# Patient Record
Sex: MALE | URBAN - METROPOLITAN AREA
[De-identification: names, ages, dates, MRNs, and addresses within clinical notes are randomized per-mention and may not be internally consistent; named-entity substitution may affect disease eponyms.]

---

## 2023-07-27 ENCOUNTER — OFFICE VISIT (OUTPATIENT)
Dept: FAMILY MEDICINE CLINIC | Age: 73
End: 2023-07-27
Payer: MEDICARE

## 2023-07-27 VITALS
SYSTOLIC BLOOD PRESSURE: 157 MMHG | BODY MASS INDEX: 26.07 KG/M2 | DIASTOLIC BLOOD PRESSURE: 78 MMHG | HEART RATE: 66 BPM | HEIGHT: 66 IN | TEMPERATURE: 97.8 F | WEIGHT: 162.2 LBS

## 2023-07-27 DIAGNOSIS — Z76.89 ESTABLISHING CARE WITH NEW DOCTOR, ENCOUNTER FOR: ICD-10-CM

## 2023-07-27 DIAGNOSIS — I10 PRIMARY HYPERTENSION: Primary | ICD-10-CM

## 2023-07-27 DIAGNOSIS — B35.1 ONYCHOMYCOSIS: ICD-10-CM

## 2023-07-27 DIAGNOSIS — L30.9 ECZEMA, UNSPECIFIED TYPE: ICD-10-CM

## 2023-07-27 PROCEDURE — 1036F TOBACCO NON-USER: CPT

## 2023-07-27 PROCEDURE — 3017F COLORECTAL CA SCREEN DOC REV: CPT

## 2023-07-27 PROCEDURE — 1123F ACP DISCUSS/DSCN MKR DOCD: CPT

## 2023-07-27 PROCEDURE — 99203 OFFICE O/P NEW LOW 30 MIN: CPT

## 2023-07-27 PROCEDURE — 3078F DIAST BP <80 MM HG: CPT

## 2023-07-27 PROCEDURE — 3077F SYST BP >= 140 MM HG: CPT

## 2023-07-27 PROCEDURE — G8419 CALC BMI OUT NRM PARAM NOF/U: HCPCS

## 2023-07-27 PROCEDURE — G8427 DOCREV CUR MEDS BY ELIG CLIN: HCPCS

## 2023-07-27 RX ORDER — AMLODIPINE BESYLATE 5 MG/1
5 TABLET ORAL DAILY
Qty: 30 TABLET | Refills: 2 | Status: SHIPPED | OUTPATIENT
Start: 2023-07-27

## 2023-07-27 RX ORDER — AMLODIPINE BESYLATE 5 MG/1
TABLET ORAL
COMMUNITY
Start: 2023-06-09 | End: 2023-07-27 | Stop reason: SDUPTHER

## 2023-07-27 SDOH — ECONOMIC STABILITY: FOOD INSECURITY: WITHIN THE PAST 12 MONTHS, YOU WORRIED THAT YOUR FOOD WOULD RUN OUT BEFORE YOU GOT MONEY TO BUY MORE.: NEVER TRUE

## 2023-07-27 SDOH — ECONOMIC STABILITY: FOOD INSECURITY: WITHIN THE PAST 12 MONTHS, THE FOOD YOU BOUGHT JUST DIDN'T LAST AND YOU DIDN'T HAVE MONEY TO GET MORE.: NEVER TRUE

## 2023-07-27 SDOH — ECONOMIC STABILITY: INCOME INSECURITY: HOW HARD IS IT FOR YOU TO PAY FOR THE VERY BASICS LIKE FOOD, HOUSING, MEDICAL CARE, AND HEATING?: NOT HARD AT ALL

## 2023-07-27 SDOH — ECONOMIC STABILITY: HOUSING INSECURITY
IN THE LAST 12 MONTHS, WAS THERE A TIME WHEN YOU DID NOT HAVE A STEADY PLACE TO SLEEP OR SLEPT IN A SHELTER (INCLUDING NOW)?: NO

## 2023-07-27 ASSESSMENT — ENCOUNTER SYMPTOMS
GASTROINTESTINAL NEGATIVE: 1
RESPIRATORY NEGATIVE: 1

## 2023-07-27 ASSESSMENT — PATIENT HEALTH QUESTIONNAIRE - PHQ9
SUM OF ALL RESPONSES TO PHQ QUESTIONS 1-9: 0
SUM OF ALL RESPONSES TO PHQ QUESTIONS 1-9: 0
1. LITTLE INTEREST OR PLEASURE IN DOING THINGS: 0
2. FEELING DOWN, DEPRESSED OR HOPELESS: 0
SUM OF ALL RESPONSES TO PHQ9 QUESTIONS 1 & 2: 0
SUM OF ALL RESPONSES TO PHQ QUESTIONS 1-9: 0
SUM OF ALL RESPONSES TO PHQ QUESTIONS 1-9: 0

## 2023-07-27 NOTE — PROGRESS NOTES
Visit Information    Have you changed or started any medications since your last visit including any over-the-counter medicines, vitamins, or herbal medicines? no   Have you stopped taking any of your medications? Is so, why? -  no  Are you having any side effects from any of your medications? - no    Have you seen any other physician or provider since your last visit?  no   Have you had any other diagnostic tests since your last visit?  no   Have you been seen in the emergency room and/or had an admission in a hospital since we last saw you?  no   Have you had your routine dental cleaning in the past 6 months?  no     Do you have an active MyChart account? If no, what is the barrier?   No: inactive    Patient Care Team:  Michelle Block MD as PCP - General (Family Medicine)    Medical History Review  Past Medical, Family, and Social History reviewed and does not contribute to the patient presenting condition    Health Maintenance   Topic Date Due    Depression Screen  Never done    Hepatitis C screen  Never done    DTaP/Tdap/Td vaccine (1 - Tdap) Never done    Lipids  Never done    Colorectal Cancer Screen  Never done    Shingles vaccine (1 of 2) Never done    Flu vaccine (1) 08/01/2023    Pneumococcal 65+ years Vaccine  Completed    COVID-19 Vaccine  Completed    Hepatitis A vaccine  Aged Out    Hib vaccine  Aged Out    Meningococcal (ACWY) vaccine  Aged Out

## 2023-07-27 NOTE — PATIENT INSTRUCTIONS
Thank you for letting us take care of you today. We hope all your questions were addressed. If a question was overlooked or something else comes to mind after you return home, please contact a member of your Care Team listed below. Your Care Team at [facility] is Team #2  Rafael Brand M.D. (Faculty)  Yanira Lema, (Resident)  Katie Davis, (Resident)  Radha Spangler, (Resident)  Edilma Strickland, (Resident)  Rosette Pressley, (Resident)  Roxy Alexander, [facility], [facility], [address] [place] [address], [facility]  Audie Carmona,  [facility]  Lucius Lopez, [facility]  Estella Llanes, [facility]  Geno Gonzalez, [facility]  Tyrone Silva) [facility], [facility] ([address])  Jeb Reza [facility] (Clinical Pharmacist)     Office phone number: [phone]    If you need to get in right away due to illness, please be advised we have \"Same Day\" appointments available Monday-Friday. Please call us at [phone] option #3 to schedule your \"Same Day\" appointment.

## 2023-08-21 ENCOUNTER — TELEPHONE (OUTPATIENT)
Dept: FAMILY MEDICINE CLINIC | Age: 73
End: 2023-08-21

## 2023-08-21 NOTE — TELEPHONE ENCOUNTER
Received call from patient pharmacy in regards to script sent for Amlodipine 5mg. Script says to take 1 tablet by mouth daily, but patient states he is taking 2 tablets daily. Script was only sent for 30 tabs. Pharmacy needs corrected order. If it's 1 tab daily with 30 tabs, or 2 tabs daily with 60 tabs. Please ensure sig is correct.

## 2023-08-22 DIAGNOSIS — I10 PRIMARY HYPERTENSION: ICD-10-CM

## 2023-08-22 RX ORDER — AMLODIPINE BESYLATE 5 MG/1
TABLET ORAL
Qty: 60 TABLET | Refills: 3 | Status: SHIPPED | OUTPATIENT
Start: 2023-08-22

## 2023-08-22 NOTE — TELEPHONE ENCOUNTER
E-scribe request for [facility]. Please review and e-scribe if applicable.      Last Visit Date:  7/27/2023  Next Visit Date:  8/24/2023    No results found for: LABA1C          ( goal A1C is < 7)   No components found for: LABMICR  No results found for: LDLCHOLESTEROL, LDLCALC    (goal LDL is <100)   No results found for: AST, ALT, BUN, CR  BP Readings from Last 3 Encounters:   07/27/23 (!) 157/78          (goal 120/80)        Patient Active Problem List:     Primary hypertension     Establishing care with new doctor, encounter for     Eczema     Onychomycosis      ----JF

## 2023-08-24 ENCOUNTER — OFFICE VISIT (OUTPATIENT)
Dept: FAMILY MEDICINE CLINIC | Age: 73
End: 2023-08-24
Payer: MEDICARE

## 2023-08-24 VITALS
WEIGHT: 165 LBS | SYSTOLIC BLOOD PRESSURE: 140 MMHG | TEMPERATURE: 97.3 F | BODY MASS INDEX: 26.63 KG/M2 | HEART RATE: 61 BPM | DIASTOLIC BLOOD PRESSURE: 79 MMHG

## 2023-08-24 DIAGNOSIS — Z13.1 DIABETES MELLITUS SCREENING: ICD-10-CM

## 2023-08-24 DIAGNOSIS — Z13.220 SCREENING FOR HYPERLIPIDEMIA: ICD-10-CM

## 2023-08-24 DIAGNOSIS — L30.9 ECZEMA, UNSPECIFIED TYPE: ICD-10-CM

## 2023-08-24 DIAGNOSIS — B35.1 ONYCHOMYCOSIS: Primary | ICD-10-CM

## 2023-08-24 PROCEDURE — G8419 CALC BMI OUT NRM PARAM NOF/U: HCPCS

## 2023-08-24 PROCEDURE — 3017F COLORECTAL CA SCREEN DOC REV: CPT

## 2023-08-24 PROCEDURE — 1036F TOBACCO NON-USER: CPT

## 2023-08-24 PROCEDURE — 3077F SYST BP >= 140 MM HG: CPT

## 2023-08-24 PROCEDURE — 1123F ACP DISCUSS/DSCN MKR DOCD: CPT

## 2023-08-24 PROCEDURE — 99213 OFFICE O/P EST LOW 20 MIN: CPT

## 2023-08-24 PROCEDURE — G8427 DOCREV CUR MEDS BY ELIG CLIN: HCPCS

## 2023-08-24 PROCEDURE — 3078F DIAST BP <80 MM HG: CPT

## 2023-08-24 PROCEDURE — 99211 OFF/OP EST MAY X REQ PHY/QHP: CPT

## 2023-08-24 ASSESSMENT — ENCOUNTER SYMPTOMS
RESPIRATORY NEGATIVE: 1
GASTROINTESTINAL NEGATIVE: 1
ROS SKIN COMMENTS: ONCHOMYCOSIS

## 2023-08-24 NOTE — PROGRESS NOTES
HYPERTENSION visit     BP Readings from Last 3 Encounters:   07/27/23 (!) 157/78       No results found for: LDLCALC, LDLCHOLESTEROL, HDL, BUN, CREATININE, GLUCOSE           Have you changed or started any medications since your last visit including any over-the-counter medicines, vitamins, or herbal medicines? no   Have you stopped taking any of your medications? Is so, why? -  no  Are you having any side effects from any of your medications? - no  How often do you miss doses of your medication? no      Have you seen any other physician or provider since your last visit?  no   Have you had any other diagnostic tests since your last visit?  no   Have you been seen in the emergency room and/or had an admission in a hospital since we last saw you?  no   Have you had your routine dental cleaning in the past 6 months?  no     Do you have an active MyChart account? If no, what is the barrier?   No:     Patient Care Team:  William Gong MD as PCP - General (Family Medicine)    Medical History Review  Past Medical, Family, and Social History reviewed and does not contribute to the patient presenting condition    Health Maintenance   Topic Date Due    Hepatitis C screen  Never done    Lipids  Never done    Colorectal Cancer Screen  Never done    Shingles vaccine (1 of 2) Never done    AAA screen  Never done    Flu vaccine (1) 08/01/2023    Annual Wellness Visit (AWV)  07/27/2023    Depression Screen  07/27/2024    DTaP/Tdap/Td vaccine (2 - Td or Tdap) 07/27/2033    Pneumococcal 65+ years Vaccine  Completed    COVID-19 Vaccine  Completed    Hepatitis A vaccine  Aged Out    Hib vaccine  Aged Out    Meningococcal (ACWY) vaccine  Aged Out

## 2023-09-23 PROBLEM — Z13.1 DIABETES MELLITUS SCREENING: Status: RESOLVED | Noted: 2023-07-27 | Resolved: 2023-09-23

## 2023-10-10 ENCOUNTER — TELEPHONE (OUTPATIENT)
Dept: FAMILY MEDICINE CLINIC | Age: 73
End: 2023-10-10

## 2023-10-10 NOTE — TELEPHONE ENCOUNTER
Tried to contact patient to inform that provider will not be in clinic on 11/24/2023 to reschedule patient's appointment

## 2023-12-26 DIAGNOSIS — I10 PRIMARY HYPERTENSION: ICD-10-CM

## 2023-12-26 NOTE — TELEPHONE ENCOUNTER
Last visit: 8/24/23  Last Med refill: 8/22/23  Does patient have enough medication for 72 hours: no    Next Visit Date:  No future appointments.     Health Maintenance   Topic Date Due    Hepatitis C screen  Never done    Lipids  Never done    Colorectal Cancer Screen  Never done    Shingles vaccine (1 of 2) Never done    Respiratory Syncytial Virus (RSV) Pregnant or age 61 yrs+ (3 - 1-dose 60+ series) Never done    AAA screen  Never done    Pneumococcal 65+ years Vaccine (2 - PCV) 10/23/2021    Annual Wellness Visit (AWV)  Never done    Flu vaccine (1) 08/01/2023    COVID-19 Vaccine (5 - 2023-24 season) 09/01/2023    Depression Screen  07/27/2024    DTaP/Tdap/Td vaccine (2 - Td or Tdap) 07/27/2033    Hepatitis A vaccine  Aged Out    Hepatitis B vaccine  Aged Out    Hib vaccine  Aged Out    Polio vaccine  Aged Out    Meningococcal (ACWY) vaccine  Aged Out       No results found for: \"LABA1C\"          ( goal A1C is < 7)   No components found for: \"LABMICR\"  No results found for: \"LDLCHOLESTEROL\", \"LDLCALC\"    (goal LDL is <100)   No results found for: \"AST\", \"ALT\", \"BUN\", \"CR\"  BP Readings from Last 3 Encounters:   08/24/23 (!) 140/79   07/27/23 (!) 157/78          (goal 120/80)    All Future Testing planned in [facility]  Lab Frequency Next Occurrence   Lipid Panel Once 08/24/2023   Hemoglobin A1C Once 08/24/2023               Patient Active Problem List:     Primary hypertension     Eczema     Onychomycosis

## 2023-12-29 RX ORDER — AMLODIPINE BESYLATE 5 MG/1
TABLET ORAL
Qty: 60 TABLET | Refills: 0 | Status: SHIPPED | OUTPATIENT
Start: 2023-12-29

## 2024-01-28 DIAGNOSIS — I10 PRIMARY HYPERTENSION: ICD-10-CM

## 2024-01-29 RX ORDER — AMLODIPINE BESYLATE 5 MG/1
TABLET ORAL
Qty: 60 TABLET | Refills: 3 | Status: SHIPPED | OUTPATIENT
Start: 2024-01-29

## 2024-01-29 NOTE — TELEPHONE ENCOUNTER
E-scribe request for NORVASC. Please review and e-scribe if applicable.     Last Visit Date:  8/24/2023  Next Visit Date:  2/1/2024    No results found for: \"LABA1C\"          ( goal A1C is < 7)   No components found for: \"LABMICR\"  No results found for: \"LDLCHOLESTEROL\", \"LDLCALC\"    (goal LDL is <100)   No results found for: \"AST\", \"ALT\", \"BUN\", \"CR\"  BP Readings from Last 3 Encounters:   08/24/23 (!) 140/79   07/27/23 (!) 157/78          (goal 120/80)        Patient Active Problem List:     Primary hypertension     Eczema     Onychomycosis      ----JF

## 2024-02-01 ENCOUNTER — OFFICE VISIT (OUTPATIENT)
Dept: FAMILY MEDICINE CLINIC | Age: 74
End: 2024-02-01
Payer: MEDICARE

## 2024-02-01 VITALS
BODY MASS INDEX: 25.23 KG/M2 | DIASTOLIC BLOOD PRESSURE: 86 MMHG | HEIGHT: 66 IN | WEIGHT: 157 LBS | SYSTOLIC BLOOD PRESSURE: 168 MMHG | HEART RATE: 67 BPM

## 2024-02-01 DIAGNOSIS — I10 PRIMARY HYPERTENSION: ICD-10-CM

## 2024-02-01 DIAGNOSIS — L30.9 ECZEMA, UNSPECIFIED TYPE: ICD-10-CM

## 2024-02-01 DIAGNOSIS — B35.1 ONYCHOMYCOSIS: Primary | ICD-10-CM

## 2024-02-01 PROCEDURE — 99213 OFFICE O/P EST LOW 20 MIN: CPT

## 2024-02-01 PROCEDURE — G8419 CALC BMI OUT NRM PARAM NOF/U: HCPCS

## 2024-02-01 PROCEDURE — 1036F TOBACCO NON-USER: CPT

## 2024-02-01 PROCEDURE — 1123F ACP DISCUSS/DSCN MKR DOCD: CPT

## 2024-02-01 PROCEDURE — 3079F DIAST BP 80-89 MM HG: CPT

## 2024-02-01 PROCEDURE — G8427 DOCREV CUR MEDS BY ELIG CLIN: HCPCS

## 2024-02-01 PROCEDURE — 3077F SYST BP >= 140 MM HG: CPT

## 2024-02-01 PROCEDURE — 3017F COLORECTAL CA SCREEN DOC REV: CPT

## 2024-02-01 PROCEDURE — G8484 FLU IMMUNIZE NO ADMIN: HCPCS

## 2024-02-01 RX ORDER — TRIAMCINOLONE ACETONIDE 0.25 MG/G
OINTMENT TOPICAL
Qty: 15 G | Refills: 1 | Status: SHIPPED | OUTPATIENT
Start: 2024-02-01 | End: 2024-02-08

## 2024-02-01 ASSESSMENT — PATIENT HEALTH QUESTIONNAIRE - PHQ9
SUM OF ALL RESPONSES TO PHQ9 QUESTIONS 1 & 2: 0
SUM OF ALL RESPONSES TO PHQ QUESTIONS 1-9: 0
2. FEELING DOWN, DEPRESSED OR HOPELESS: 0
1. LITTLE INTEREST OR PLEASURE IN DOING THINGS: 0
SUM OF ALL RESPONSES TO PHQ QUESTIONS 1-9: 0

## 2024-02-01 ASSESSMENT — ENCOUNTER SYMPTOMS
RESPIRATORY NEGATIVE: 1
GASTROINTESTINAL NEGATIVE: 1

## 2024-02-01 NOTE — PROGRESS NOTES
Visit Information    Have you changed or started any medications since your last visit including any over-the-counter medicines, vitamins, or herbal medicines? no   Have you stopped taking any of your medications? Is so, why? -  no  Are you having any side effects from any of your medications? - no    Have you seen any other physician or provider since your last visit?  no   Have you had any other diagnostic tests since your last visit?  no   Have you been seen in the emergency room and/or had an admission in a hospital since we last saw you?  no   Have you had your routine dental cleaning in the past 6 months?  no     Do you have an active MyChart account? If no, what is the barrier?  No    Patient Care Team:  Hilario Cheng MD as PCP - General (Family Medicine)    Medical History Review  Past Medical, Family, and Social History reviewed and does contribute to the patient presenting condition    Health Maintenance   Topic Date Due    Hepatitis C screen  Never done    Lipids  Never done    Colorectal Cancer Screen  Never done    Shingles vaccine (1 of 2) Never done    Respiratory Syncytial Virus (RSV) Pregnant or age 60 yrs+ (1 - 1-dose 60+ series) Never done    AAA screen  Never done    Pneumococcal 65+ years Vaccine (2 - PCV) 10/23/2021    Annual Wellness Visit (Medicare Advantage)  Never done    Depression Screen  07/27/2024    DTaP/Tdap/Td vaccine (2 - Td or Tdap) 07/27/2033    Flu vaccine  Completed    COVID-19 Vaccine  Completed    Hepatitis A vaccine  Aged Out    Hepatitis B vaccine  Aged Out    Hib vaccine  Aged Out    Polio vaccine  Aged Out    Meningococcal (ACWY) vaccine  Aged Out

## 2024-02-01 NOTE — PROGRESS NOTES
Attending Physician Statement  I have discussed the care of Luis Jimenez, 73 y.o. male,including pertinent history and exam findings,  with the resident Hilario Cook MD.  History:  Chief Complaint   Patient presents with    Hypertension     Last seen 8/24/23    Nail Problem     Onychomycosis, f/u    Eczema     Using Hydrocortisone cream     I have reviewed the key elements of the encounter with the resident. Examination was done by resident as documented in residents note.  BP Readings from Last 3 Encounters:   02/01/24 (!) 168/86   08/24/23 (!) 140/79   07/27/23 (!) 157/78     BP (!) 168/86 (Site: Left Upper Arm, Position: Sitting, Cuff Size: Medium Adult)   Pulse 67   Ht 1.676 m (5' 6\")   Wt 71.2 kg (157 lb)   BMI 25.34 kg/m²   No results found for: \"WBC\", \"HGB\", \"HCT\", \"PLT\", \"CHOL\", \"TRIG\", \"HDL\", \"LDLDIRECT\", \"ALT\", \"AST\", \"NA\", \"K\", \"CL\", \"CREATININE\", \"BUN\", \"CO2\", \"TSH\", \"PSA\", \"INR\", \"GLUF\", \"LABA1C\"  No results found for: \"CALCIUM\", \"PHOS\"  No results found for: \"LDLCALC\", \"LDLCHOLESTEROL\", \"LDLDIRECT\"  I agree with the assessment, plan and diagnosis of    Diagnosis Orders   1. Onychomycosis  ciclopirox (PENLAC) 8 % solution      2. Eczema, unspecified type  triamcinolone (KENALOG) 0.025 % ointment      3. Primary hypertension          I agree with  orders as documented by the resident.  Recommendations: Agree with resident assessment and plan.  Per resident team concern for possible patient only taking 5 mg at night with his amlodipine and also patient resistance to increasing dose.  Patient may be a candidate for combo medication with 5 mg amlodipine with benazepril or a ARB.  Close follow-up recommended and patient also to bring blood pressure cuff and along with log to next visit.  Return in about 2 months (around 4/1/2024) for nail fungus.   (GE Modifier ) Dr. BRIAN PERKINS MD

## 2024-02-01 NOTE — PROGRESS NOTES
[facility] Residency Program - Outpatient Note      Subjective:    Luis Jimenez is a 73 y.o. male with  has no past medical history on file.    Presented to the office today for:  Chief Complaint   Patient presents with    Hypertension     Last seen 8/24/23    Nail Problem     Onychomycosis, f/u    Eczema     Using Hydrocortisone cream       HPI  Established patient 73-year-old male is here to follow-up on his blood pressure, onychomycosis and eczema    HTN  Blood pressure 177/88 repeat is 168/86,  completely asymptomatic, denies any headache, blurry vision, chest pain, dizziness  Patient does not take his medications in morning  Reports he prefers it at night, prefers 5 mg two tabs    Onchomycosis  Reports is getting better  Patient was started on Penlac solution, was given a refill, total duration of treatment would be 12 weeks and will go from there    [facility]  Cologuard  a year ago - negative  Will get shingles vaccine soon      Review of Systems   HENT: Negative.     Respiratory: Negative.     Cardiovascular: Negative.    Gastrointestinal: Negative.    Genitourinary: Negative.    Skin:         Eczema, Onchomycosis   Neurological: Negative.    Psychiatric/Behavioral: Negative.                   The patient has a No family history on file.    Objective:    BP (!) 168/86 (Site: Left Upper Arm, Position: Sitting, Cuff Size: Medium Adult)   Pulse 67   Ht 1.676 m (5' 6\")   Wt 71.2 kg (157 lb)   BMI 25.34 kg/m²    BP Readings from Last 3 Encounters:   02/01/24 (!) 168/86   08/24/23 (!) 140/79   07/27/23 (!) 157/78       Physical Exam  Vitals and nursing note reviewed.   Cardiovascular:      Rate and Rhythm: Normal rate and regular rhythm.      Pulses: Normal pulses.      Heart sounds: Normal heart sounds.   Pulmonary:      Effort: Pulmonary effort is normal.      Breath sounds: Normal breath sounds.   Abdominal:      General: Bowel sounds are normal.      Palpations:

## 2024-04-04 ENCOUNTER — OFFICE VISIT (OUTPATIENT)
Dept: FAMILY MEDICINE CLINIC | Age: 74
End: 2024-04-04

## 2024-04-04 VITALS
HEIGHT: 66 IN | WEIGHT: 161 LBS | HEART RATE: 69 BPM | OXYGEN SATURATION: 99 % | BODY MASS INDEX: 25.88 KG/M2 | DIASTOLIC BLOOD PRESSURE: 84 MMHG | SYSTOLIC BLOOD PRESSURE: 142 MMHG

## 2024-04-04 DIAGNOSIS — B35.1 ONYCHOMYCOSIS: ICD-10-CM

## 2024-04-04 DIAGNOSIS — Z13.1 SCREENING FOR DIABETES MELLITUS: ICD-10-CM

## 2024-04-04 DIAGNOSIS — Z13.220 SCREENING FOR HYPERLIPIDEMIA: ICD-10-CM

## 2024-04-04 DIAGNOSIS — I10 PRIMARY HYPERTENSION: Primary | ICD-10-CM

## 2024-04-04 ASSESSMENT — ENCOUNTER SYMPTOMS: GASTROINTESTINAL NEGATIVE: 1

## 2024-04-04 NOTE — PROGRESS NOTES
HYPERTENSION visit     BP Readings from Last 3 Encounters:   02/01/24 (!) 168/86   08/24/23 (!) 140/79   07/27/23 (!) 157/78       No results found for: \"LDLCALC\", \"LDLCHOLESTEROL\", \"HDL\", \"BUN\", \"CREATININE\", \"GLUCOSE\"           Have you changed or started any medications since your last visit including any over-the-counter medicines, vitamins, or herbal medicines? no   Have you stopped taking any of your medications? Is so, why? -  no  Are you having any side effects from any of your medications? - no  How often do you miss doses of your medication? no      Have you seen any other physician or provider since your last visit?  no   Have you had any other diagnostic tests since your last visit?  no   Have you been seen in the emergency room and/or had an admission in a hospital since we last saw you?  no   Have you had your routine dental cleaning in the past 6 months?  no     Do you have an active MyChart account? If no, what is the barrier?  Yes    Patient Care Team:  Hilario Cheng MD as PCP - General (Family Medicine)    Medical History Review  Past Medical, Family, and Social History reviewed and does not contribute to the patient presenting condition    Health Maintenance   Topic Date Due    Hepatitis C screen  Never done    Lipids  Never done    Colorectal Cancer Screen  Never done    Respiratory Syncytial Virus (RSV) Pregnant or age 60 yrs+ (1 - 1-dose 60+ series) Never done    AAA screen  Never done    Pneumococcal 65+ years Vaccine (2 of 2 - PCV) 10/23/2021    Annual Wellness Visit (Medicare Advantage)  Never done    Shingles vaccine (2 of 2) 05/20/2024    Depression Screen  02/01/2025    DTaP/Tdap/Td vaccine (2 - Td or Tdap) 07/27/2033    Flu vaccine  Completed    COVID-19 Vaccine  Completed    Hepatitis A vaccine  Aged Out    Hepatitis B vaccine  Aged Out    Hib vaccine  Aged Out    Polio vaccine  Aged Out    Meningococcal (ACWY) vaccine  Aged Out

## 2024-04-04 NOTE — PROGRESS NOTES
[facility] Residency Program - Outpatient Note      Subjective:    Luis Jimenez is a 73 y.o. male with  has no past medical history on file.    Presented to the office today for:  Chief Complaint   Patient presents with    Nail Problem     Patient here to discuss baby toe nail problem        HPI      HTN  -Blood pressure 158/87 today, repeat 142/84  -Norvasc 10 mg at night (Patient prefers medication into 5 mg tab)    Onychomycosis  -Patient was prescribed Penlac 8% solution to use  -Has been using intermittently  -Symptoms improving, refer to media, can compare progress at the next visit by comparing the pictures    care gaps  -A1c, lipids, Cmp ordered today  -Vaccines, pneumococcal and   -shingles, received 1 dose, will go back to [facility] for second dose  -Reports Cologuard was completed a year ago and it was negative, records not available  -Smoking - Patient smoked for almost 20 to 25 years stopping in early 90s   -AAA screen US counseling info prvided today    -Received pneumococcal PPSV23 in 2020 at [facility] when he was 70 years old, he will need 1 dose of pneumococcal 20 vaccine, patient reports he would call [facility] and ask if he has already received it or not          Review of Systems   Constitutional: Negative.    HENT: Negative.     Respiratory: Negative.     Cardiovascular: Negative.    Gastrointestinal: Negative.    Genitourinary: Negative.    Musculoskeletal: Negative.                  The patient has a No family history on file.    Objective:    BP (!) 142/84 (Site: Left Upper Arm, Position: Sitting, Cuff Size: Medium Adult) Comment: m  Pulse 69   Ht 1.676 m (5' 6\")   Wt 73 kg (161 lb)   SpO2 99%   BMI 25.99 kg/m²    BP Readings from Last 3 Encounters:   04/04/24 (!) 142/84   02/01/24 (!) 168/86   08/24/23 (!) 140/79       Physical Exam  Vitals and nursing note reviewed.   Constitutional:       Appearance: Normal appearance.   HENT:      Head: Normocephalic

## 2024-04-04 NOTE — PATIENT INSTRUCTIONS
Thank you for letting us take care of you today. We hope all your questions were addressed. If a question was overlooked or something else comes to mind after you return home, please contact a member of your Care Team listed below.      Your Care Team at [facility] is Team #2  Bam Cueva M.D. (Faculty)  Anahi Samuel, (Resident)  Onur Pruitt, (Resident)  Svetlana Arias (Resident)  Leona Mejia, (Resident)  Jackie Beard, [facility]  David Yang, JUAN R Callahan, [facility]  Sivan Galloway, [facility]  Mary Dickens,  [facility]  Jocelyn Davis, [facility]  Jodi Murrieta, [facility]  Nikia Rayo, [facility]  Tyrone (LJ) Johnny JUAN R (Clinical Practice Manager)  Laine Chung [facility] (Clinical Pharmacist)     Office phone number: [phone]    If you need to get in right away due to illness, please be advised we have \"Same Day\" appointments available Monday-Friday. Please call us at [phone] option #3 to schedule your \"Same Day\" appointment.

## 2024-05-04 PROBLEM — Z13.220 SCREENING FOR HYPERLIPIDEMIA: Status: RESOLVED | Noted: 2023-07-27 | Resolved: 2024-05-04

## 2024-06-07 DIAGNOSIS — I10 PRIMARY HYPERTENSION: ICD-10-CM

## 2024-06-07 NOTE — TELEPHONE ENCOUNTER
Please address the medication refill and close the encounter.  If I can be of assistance, please route to the applicable pool.      Thank you.      Last visit: 4-4-24  Last Med refill: 1-29-24  Does patient have enough medication for 72 hours: No:     Next Visit Date:  No future appointments.    Health Maintenance   Topic Date Due    Hepatitis C screen  Never done    Lipids  Never done    Colorectal Cancer Screen  Never done    Respiratory Syncytial Virus (RSV) Pregnant or age 60 yrs+ (1 - 1-dose 60+ series) Never done    AAA screen  Never done    Pneumococcal 65+ years Vaccine (2 of 2 - PCV) 10/23/2021    Annual Wellness Visit (Medicare Advantage)  Never done    Shingles vaccine (2 of 2) 05/20/2024    Depression Screen  02/01/2025    DTaP/Tdap/Td vaccine (2 - Td or Tdap) 07/27/2033    Flu vaccine  Completed    COVID-19 Vaccine  Completed    Hepatitis A vaccine  Aged Out    Hepatitis B vaccine  Aged Out    Hib vaccine  Aged Out    Polio vaccine  Aged Out    Meningococcal (ACWY) vaccine  Aged Out       No results found for: \"LABA1C\"          ( goal A1C is < 7)   No components found for: \"LABMICR\"  No components found for: \"LDLCHOLESTEROL\", \"LDLCALC\"    (goal LDL is <100)   No results found for: \"AST\", \"ALT\", \"BUN\", \"CR\"  BP Readings from Last 3 Encounters:   04/04/24 (!) 142/84   02/01/24 (!) 168/86   08/24/23 (!) 140/79          (goal 120/80)    All Future Testing planned in [facility]  Lab Frequency Next Occurrence   Lipid Panel Once 08/24/2023   Hemoglobin A1C Once 08/24/2023   Hemoglobin A1C Once 04/04/2024   Lipid Panel Once 04/04/2024   Comprehensive Metabolic Panel Once 04/04/2024               Patient Active Problem List:     Primary hypertension     Eczema     Onychomycosis

## 2024-06-08 RX ORDER — AMLODIPINE BESYLATE 5 MG/1
TABLET ORAL
Qty: 60 TABLET | Refills: 0 | Status: SHIPPED | OUTPATIENT
Start: 2024-06-08

## 2024-07-05 DIAGNOSIS — I10 PRIMARY HYPERTENSION: ICD-10-CM

## 2024-07-05 NOTE — TELEPHONE ENCOUNTER
E-scribe request for amlodipine. Please review and e-scribe if applicable.     Last Visit Date:  4/4/24  Next Visit Date:  Visit date not found    No results found for: \"LABA1C\"          ( goal A1C is < 7)   No components found for: \"LABMICR\"  No components found for: \"LDLCHOLESTEROL\", \"LDLCALC\"    (goal LDL is <100)   No results found for: \"AST\", \"ALT\", \"BUN\", \"CR\"  BP Readings from Last 3 Encounters:   04/04/24 (!) 142/84   02/01/24 (!) 168/86   08/24/23 (!) 140/79          (goal 120/80)        Patient Active Problem List:     Primary hypertension     Eczema     Onychomycosis      ----JF

## 2024-07-06 RX ORDER — AMLODIPINE BESYLATE 5 MG/1
TABLET ORAL
Qty: 60 TABLET | Refills: 3 | Status: SHIPPED | OUTPATIENT
Start: 2024-07-06

## 2024-08-15 ENCOUNTER — OFFICE VISIT (OUTPATIENT)
Dept: FAMILY MEDICINE CLINIC | Age: 74
End: 2024-08-15
Payer: MEDICARE

## 2024-08-15 ENCOUNTER — TELEPHONE (OUTPATIENT)
Dept: FAMILY MEDICINE CLINIC | Age: 74
End: 2024-08-15

## 2024-08-15 VITALS
HEIGHT: 66 IN | HEART RATE: 69 BPM | SYSTOLIC BLOOD PRESSURE: 130 MMHG | WEIGHT: 160.2 LBS | OXYGEN SATURATION: 97 % | DIASTOLIC BLOOD PRESSURE: 70 MMHG | BODY MASS INDEX: 25.75 KG/M2 | TEMPERATURE: 99.6 F

## 2024-08-15 DIAGNOSIS — I10 PRIMARY HYPERTENSION: Primary | ICD-10-CM

## 2024-08-15 DIAGNOSIS — B35.1 ONYCHOMYCOSIS: ICD-10-CM

## 2024-08-15 PROCEDURE — 1123F ACP DISCUSS/DSCN MKR DOCD: CPT

## 2024-08-15 PROCEDURE — G8419 CALC BMI OUT NRM PARAM NOF/U: HCPCS

## 2024-08-15 PROCEDURE — 3075F SYST BP GE 130 - 139MM HG: CPT

## 2024-08-15 PROCEDURE — G8427 DOCREV CUR MEDS BY ELIG CLIN: HCPCS

## 2024-08-15 PROCEDURE — 1036F TOBACCO NON-USER: CPT

## 2024-08-15 PROCEDURE — 3078F DIAST BP <80 MM HG: CPT

## 2024-08-15 PROCEDURE — 99211 OFF/OP EST MAY X REQ PHY/QHP: CPT

## 2024-08-15 PROCEDURE — 3017F COLORECTAL CA SCREEN DOC REV: CPT

## 2024-08-15 PROCEDURE — 99213 OFFICE O/P EST LOW 20 MIN: CPT

## 2024-08-15 RX ORDER — TRIAMCINOLONE ACETONIDE 5 MG/G
OINTMENT TOPICAL
COMMUNITY
Start: 2024-06-24

## 2024-08-15 SDOH — ECONOMIC STABILITY: FOOD INSECURITY: WITHIN THE PAST 12 MONTHS, YOU WORRIED THAT YOUR FOOD WOULD RUN OUT BEFORE YOU GOT MONEY TO BUY MORE.: NEVER TRUE

## 2024-08-15 SDOH — ECONOMIC STABILITY: INCOME INSECURITY: HOW HARD IS IT FOR YOU TO PAY FOR THE VERY BASICS LIKE FOOD, HOUSING, MEDICAL CARE, AND HEATING?: NOT HARD AT ALL

## 2024-08-15 SDOH — ECONOMIC STABILITY: FOOD INSECURITY: WITHIN THE PAST 12 MONTHS, THE FOOD YOU BOUGHT JUST DIDN'T LAST AND YOU DIDN'T HAVE MONEY TO GET MORE.: NEVER TRUE

## 2024-08-15 ASSESSMENT — PATIENT HEALTH QUESTIONNAIRE - PHQ9
SUM OF ALL RESPONSES TO PHQ QUESTIONS 1-9: 0
2. FEELING DOWN, DEPRESSED OR HOPELESS: NOT AT ALL
SUM OF ALL RESPONSES TO PHQ9 QUESTIONS 1 & 2: 0
SUM OF ALL RESPONSES TO PHQ QUESTIONS 1-9: 0
SUM OF ALL RESPONSES TO PHQ QUESTIONS 1-9: 0
1. LITTLE INTEREST OR PLEASURE IN DOING THINGS: NOT AT ALL
SUM OF ALL RESPONSES TO PHQ QUESTIONS 1-9: 0

## 2024-08-15 ASSESSMENT — ENCOUNTER SYMPTOMS
RESPIRATORY NEGATIVE: 1
GASTROINTESTINAL NEGATIVE: 1

## 2024-08-15 NOTE — PROGRESS NOTES
HYPERTENSION visit     BP Readings from Last 3 Encounters:   04/04/24 (!) 142/84   02/01/24 (!) 168/86   08/24/23 (!) 140/79       No results found for: \"HDL\", \"BUN\", \"CREATININE\", \"GLUCOSE\"           Have you changed or started any medications since your last visit including any over-the-counter medicines, vitamins, or herbal medicines? no   Have you stopped taking any of your medications? Is so, why? -  yes, see lsit   Are you having any side effects from any of your medications? - no  How often do you miss doses of your medication? rare      Have you seen any other physician or provider since your last visit?  yes - Dermatologist    Have you had any other diagnostic tests since your last visit?  no   Have you been seen in the emergency room and/or had an admission in a hospital since we last saw you?  no   Have you had your routine dental cleaning in the past 6 months?  no     Do you have an active MyChart account? If no, what is the barrier?  Yes    Patient Care Team:  Hilario Cheng MD as PCP - General (Family Medicine)    Medical History Review  Past Medical, Family, and Social History reviewed and does not contribute to the patient presenting condition    Health Maintenance   Topic Date Due    Hepatitis C screen  Never done    Lipids  Never done    Colorectal Cancer Screen  Never done    Respiratory Syncytial Virus (RSV) Pregnant or age 60 yrs+ (1 - 1-dose 60+ series) Never done    AAA screen  Never done    Pneumococcal 65+ years Vaccine (2 of 2 - PCV) 10/23/2021    Annual Wellness Visit (Medicare Advantage)  Never done    COVID-19 Vaccine (6 - 2023-24 season) 03/13/2024    Flu vaccine (1) 08/01/2024    Depression Screen  02/01/2025    DTaP/Tdap/Td vaccine (2 - Td or Tdap) 07/27/2033    Shingles vaccine  Completed    Hepatitis A vaccine  Aged Out    Hepatitis B vaccine  Aged Out    Hib vaccine  Aged Out    Polio vaccine  Aged Out    Meningococcal (ACWY) vaccine  Aged Out

## 2024-08-15 NOTE — PROGRESS NOTES
Attending Physician Statement  I have discussed the care of Luis Jimenez, including pertinent history and exam findings,  with the resident. I have reviewed the key elements of all parts of the encounter with the resident.  I agree with the assessment, plan and orders as documented by the resident.  (GE Modifier)    Carly Molina MD

## 2024-08-15 NOTE — TELEPHONE ENCOUNTER
Writer spoke to Bianca from [facility] office in regards to patient cologuard results. Patient had done 2022. Bianca said was at [facility] will forward results to office.

## 2024-08-15 NOTE — PATIENT INSTRUCTIONS
Thank you for letting us take care of you today. We hope all your questions were addressed. If a question was overlooked or something else comes to mind after you return home, please contact a member of your Care Team listed below.        Your Care Team at [facility] is Team #4  Mohinder Camarena M.D. (Faculty)  Hilario Cheng M.D. (Resident)  Glory Bautista M.D.  (Resident)  Anika Carroll M.D. (Resident)  Ronni Spencer M.D. (Resident)  David Yang, JUAN R Callahan, JUAN R Galloway, [facility]  Nikia Rayo, [facility]  Jocelyn Davis, [facility]  Mary Dickens, JUAN R Murrieta, JUAN R Hansen (LJ) MORRIS Turner (Clinical Practice Manager)  Laine Chung [facility] (Clinical Pharmacist)       Office phone number: [phone]    If you need to get in right away due to illness, please be advised we have \"Same Day\" appointments available Monday-Friday. Please call us at [phone] option #3 to schedule your \"Same Day\" appointment.

## 2024-08-15 NOTE — PROGRESS NOTES
[facility] Residency Program - Outpatient Note      Subjective:    Luis Jimenez is a 74 y.o. male with  has no past medical history on file.    Presented to the office today for:  Chief Complaint   Patient presents with    Hypertension    Nail Problem    Health Maintenance     Cologuard was done 01/22.        HPI    HTN  /81, repeat 161/79, states he is very anxious, reports he has white coat hypertension, documents his BP daily and SBP Home readings in 120s - 130s  Repeat blood pressure 130/70  Completely asymptomatic today  Reports taking Norvasc 5 mg bid  Will recheck and follow up in clinic    Onychomycosis  Improving  Using ciclopirox topical    Healthcare maintenance  Lipid profile, A1c, CMP completed yesterday at [facility], will get results soon  Cologuard completed per patient - negative in 2022   Completed shingles 2 doses per patient  Pneumococcal? PCV 20    Former smoker  Smoked cig for 20 years, 1ppd, quit smoking in 1991    Review of Systems   Constitutional: Negative.    HENT: Negative.     Respiratory: Negative.     Cardiovascular: Negative.    Gastrointestinal: Negative.    Genitourinary: Negative.    Musculoskeletal: Negative.    Neurological: Negative.                  The patient has a No family history on file.    Objective:    /70 (Site: Left Upper Arm, Position: Sitting, Cuff Size: Medium Adult)   Pulse 69   Temp 99.6 °F (37.6 °C) (Temporal)   Ht 1.676 m (5' 5.98\")   Wt 72.7 kg (160 lb 3.2 oz)   SpO2 97%   BMI 25.87 kg/m²    BP Readings from Last 3 Encounters:   08/15/24 130/70   04/04/24 (!) 142/84   02/01/24 (!) 168/86       Physical Exam  Vitals and nursing note reviewed.   Constitutional:       General: He is not in acute distress.     Appearance: Normal appearance.   Cardiovascular:      Rate and Rhythm: Normal rate and regular rhythm.      Pulses: Normal pulses.      Heart sounds: Normal heart sounds.   Pulmonary:

## 2024-08-20 ENCOUNTER — OFFICE VISIT (OUTPATIENT)
Dept: FAMILY MEDICINE CLINIC | Age: 74
End: 2024-08-20
Payer: MEDICARE

## 2024-08-20 VITALS
DIASTOLIC BLOOD PRESSURE: 86 MMHG | WEIGHT: 162.8 LBS | HEART RATE: 74 BPM | HEIGHT: 65 IN | BODY MASS INDEX: 27.12 KG/M2 | SYSTOLIC BLOOD PRESSURE: 135 MMHG

## 2024-08-20 DIAGNOSIS — Z00.00 INITIAL MEDICARE ANNUAL WELLNESS VISIT: Primary | ICD-10-CM

## 2024-08-20 PROCEDURE — 99211 OFF/OP EST MAY X REQ PHY/QHP: CPT | Performed by: FAMILY MEDICINE

## 2024-08-20 ASSESSMENT — PATIENT HEALTH QUESTIONNAIRE - PHQ9
SUM OF ALL RESPONSES TO PHQ QUESTIONS 1-9: 0
1. LITTLE INTEREST OR PLEASURE IN DOING THINGS: NOT AT ALL
SUM OF ALL RESPONSES TO PHQ9 QUESTIONS 1 & 2: 0
2. FEELING DOWN, DEPRESSED OR HOPELESS: NOT AT ALL
SUM OF ALL RESPONSES TO PHQ QUESTIONS 1-9: 0

## 2024-08-20 ASSESSMENT — LIFESTYLE VARIABLES
HOW MANY STANDARD DRINKS CONTAINING ALCOHOL DO YOU HAVE ON A TYPICAL DAY: 1 OR 2
HOW OFTEN DO YOU HAVE A DRINK CONTAINING ALCOHOL: MONTHLY OR LESS

## 2024-08-20 NOTE — PROGRESS NOTES
Medicare Annual Wellness Visit    Luis Jimenez is here for Medicare AWV    Assessment & Plan   Initial Medicare annual wellness visit  Recommendations for Preventive Services Due: see orders and patient instructions/AVS.  Recommended screening schedule for the next 5-10 years is provided to the patient in written form: see Patient Instructions/AVS.     No follow-ups on file.     Subjective       Patient's complete Health Risk Assessment and screening values have been reviewed and are found in Flowsheets. The following problems were reviewed today and where indicated follow up appointments were made and/or referrals ordered.    Positive Risk Factor Screenings with Interventions:                        Advanced Directives:  Do you have a Living Will?: (!) No    Intervention:  has NO advanced directive - information provided                     Objective   Vitals:    08/20/24 0938   BP: 135/86   Pulse: 74   Weight: 73.8 kg (162 lb 12.8 oz)   Height: 1.651 m (5' 5\")      Body mass index is 27.09 kg/m².                  No Known Allergies  Prior to Visit Medications    Medication Sig Taking? Authorizing Provider   triamcinolone (ARISTOCORT) 0.5 % ointment  Yes Provider, MD Ayaka   amLODIPine (NORVASC) 5 MG tablet TAKE ONE TABLET BY MOUTH TWICE DAILY Yes Hilario Cheng MD   ciclopirox (PENLAC) 8 % solution Apply topically nightly. Yes Hilario Cheng MD       [facility] (Including outside providers/suppliers regularly involved in providing care):   Patient Care Team:  Hilario Cheng MD as PCP - General (Family Medicine)      Reviewed and updated this visit:  Tobacco  Allergies  Meds  Med Hx  Surg Hx  Soc Hx  Fam Hx      I, Shani Sweeney LPN, 8/20/2024, performed the documented evaluation under the direct supervision of the attending physician.

## 2024-09-19 PROBLEM — Z00.00 INITIAL MEDICARE ANNUAL WELLNESS VISIT: Status: RESOLVED | Noted: 2024-08-20 | Resolved: 2024-09-19

## 2024-12-03 ENCOUNTER — OFFICE VISIT (OUTPATIENT)
Dept: FAMILY MEDICINE CLINIC | Age: 74
End: 2024-12-03

## 2024-12-03 VITALS
BODY MASS INDEX: 27.02 KG/M2 | HEIGHT: 65 IN | WEIGHT: 162.2 LBS | HEART RATE: 68 BPM | DIASTOLIC BLOOD PRESSURE: 81 MMHG | SYSTOLIC BLOOD PRESSURE: 139 MMHG

## 2024-12-03 DIAGNOSIS — I10 PRIMARY HYPERTENSION: Primary | ICD-10-CM

## 2024-12-03 DIAGNOSIS — B35.1 ONYCHOMYCOSIS: ICD-10-CM

## 2024-12-03 RX ORDER — CICLOPIROX 80 MG/ML
SOLUTION TOPICAL
Qty: 6 ML | Refills: 3 | Status: SHIPPED | OUTPATIENT
Start: 2024-12-03

## 2024-12-03 RX ORDER — AMLODIPINE BESYLATE 5 MG/1
5 TABLET ORAL 2 TIMES DAILY
Qty: 120 TABLET | Refills: 5 | Status: SHIPPED | OUTPATIENT
Start: 2024-12-03

## 2024-12-03 ASSESSMENT — PATIENT HEALTH QUESTIONNAIRE - PHQ9
SUM OF ALL RESPONSES TO PHQ QUESTIONS 1-9: 0
2. FEELING DOWN, DEPRESSED OR HOPELESS: NOT AT ALL
SUM OF ALL RESPONSES TO PHQ9 QUESTIONS 1 & 2: 0
1. LITTLE INTEREST OR PLEASURE IN DOING THINGS: NOT AT ALL
SUM OF ALL RESPONSES TO PHQ QUESTIONS 1-9: 0

## 2024-12-03 ASSESSMENT — ENCOUNTER SYMPTOMS
GASTROINTESTINAL NEGATIVE: 1
RESPIRATORY NEGATIVE: 1

## 2024-12-03 NOTE — PROGRESS NOTES
Attending Physician Statement  I  have discussed the care of Luis Jimenez including pertinent history and exam findings with the resident. I agree with the assessment, plan and orders as documented by the resident.      /81 (Site: Left Upper Arm, Position: Sitting, Cuff Size: Medium Adult)   Pulse 68   Ht 1.651 m (5' 5\")   Wt 73.6 kg (162 lb 3.2 oz)   BMI 26.99 kg/m²    BP Readings from Last 3 Encounters:   12/03/24 139/81   08/20/24 135/86   08/15/24 130/70     Wt Readings from Last 3 Encounters:   12/03/24 73.6 kg (162 lb 3.2 oz)   08/20/24 73.8 kg (162 lb 12.8 oz)   08/15/24 72.7 kg (160 lb 3.2 oz)      Diagnosis Orders   1. Primary hypertension  amLODIPine (NORVASC) 5 MG tablet      2. Onychomycosis  ciclopirox (PENLAC) 8 % solution        Bam Cueva MD 12/4/2024 4:10 PM      
Visit Information    Have you changed or started any medications since your last visit including any over-the-counter medicines, vitamins, or herbal medicines? no   Are you having any side effects from any of your medications? -  no  Have you stopped taking any of your medications? Is so, why? -  no    Have you seen any other physician or provider since your last visit? No  Have you had any other diagnostic tests since your last visit? No  Have you been seen in the emergency room and/or had an admission to a hospital since we last saw you? No  Have you had your routine dental cleaning in the past 6 months? no    Have you activated your [employer] account? If not, what are your barriers? Yes     Patient Care Team:  Hilario Cheng MD as PCP - General (Family Medicine)    Medical History Review  Past Medical, Family, and Social History reviewed and does not contribute to the patient presenting condition    Health Maintenance   Topic Date Due    Hepatitis C screen  Never done    Lipids  Never done    AAA screen  Never done    Pneumococcal 65+ years Vaccine (2 of 2 - PCV) 10/23/2021    Respiratory Syncytial Virus (RSV) Pregnant or age 60 yrs+ (1 - 1-dose 75+ series) Never done    Colorectal Cancer Screen  05/17/2025    Depression Screen  08/20/2025    DTaP/Tdap/Td vaccine (2 - Td or Tdap) 07/27/2033    Annual Wellness Visit (Medicare Advantage)  Completed    Flu vaccine  Completed    Shingles vaccine  Completed    COVID-19 Vaccine  Completed    Hepatitis A vaccine  Aged Out    Hepatitis B vaccine  Aged Out    Hib vaccine  Aged Out    Polio vaccine  Aged Out    Meningococcal (ACWY) vaccine  Aged Out       
     Palpations: Abdomen is soft.   Musculoskeletal:      Right lower leg: No edema.      Left lower leg: No edema.   Neurological:      Mental Status: He is alert and oriented to person, place, and time.         No results found for: \"WBC\", \"HGB\", \"HCT\", \"PLT\", \"CHOL\", \"TRIG\", \"HDL\", \"LDLDIRECT\", \"ALT\", \"AST\", \"NA\", \"K\", \"CL\", \"CREATININE\", \"BUN\", \"CO2\", \"TSH\", \"PSA\", \"INR\", \"GLUF\", \"LABA1C\"  No results found for: \"CALCIUM\", \"PHOS\"  No results found for: \"LDLDIRECT\"    Assessment and Plan:    1. Primary hypertension  Continue current regimen  - amLODIPine (NORVASC) 5 MG tablet; Take 1 tablet by mouth 2 times daily  Dispense: 120 tablet; Refill: 5    2. Onychomycosis  Refill provided  - ciclopirox (PENLAC) 8 % solution; Apply topically nightly.  Dispense: 6 mL; Refill: 3          Requested Prescriptions     Signed Prescriptions Disp Refills    ciclopirox (PENLAC) 8 % solution 6 mL 3     Sig: Apply topically nightly.    amLODIPine (NORVASC) 5 MG tablet 120 tablet 5     Sig: Take 1 tablet by mouth 2 times daily       Medications Discontinued During This Encounter   Medication Reason    ciclopirox (PENLAC) 8 % solution REORDER    amLODIPine (NORVASC) 5 MG tablet REORDER       Luis received counseling on the following healthy behaviors: nutrition, exercise and medication adherence    Discussed use,benefit, and side effects of prescribed medications.  Barriers to medication compliance addressed.      All patient questions answered.  Pt voiced understanding.     Return in about 3 months (around 3/3/2025) for LAbs .        Disclaimer: Some orall of this note was transcribed using voice-recognition software.This may cause typographical errors occasionally. Although all effort is made to fix these errors, please do not hesitate to contact our office if there isany concern with the understanding of this note.

## 2024-12-03 NOTE — PATIENT INSTRUCTIONS
Thank you for letting us take care of you today. We hope all your questions were addressed. If a question was overlooked or something else comes to mind after you return home, please contact a member of your Care Team listed below.        Your Care Team at [facility] is Team #4  Mohinder Camarena M.D. (Faculty)  Hilario Cheng M.D. (Resident)  Glory Bautista M.D.  (Resident)  Anika Carroll M.D. (Resident)  Ronni Spencer M.D. (Resident)  David Yang, JUAN R Callahan, JUAN R Galloway, [facility]  Nikia Rayo, [facility]  Jocelyn Davis, [facility]  Mary Dickens, JUAN R Murrieta, JUAN R [facility] (LJ) MORRIS Turner (Clinical Practice Manager)  Laine Chung [facility] (Clinical Pharmacist)       Office phone number: [phone]    If you need to get in right away due to illness, please be advised we have \"Same Day\" appointments available Monday-Friday. Please call us at [phone] option #3 to schedule your \"Same Day\" appointment.

## 2025-03-25 ENCOUNTER — OFFICE VISIT (OUTPATIENT)
Dept: FAMILY MEDICINE CLINIC | Age: 75
End: 2025-03-25
Payer: MEDICARE

## 2025-03-25 ENCOUNTER — TELEPHONE (OUTPATIENT)
Dept: FAMILY MEDICINE CLINIC | Age: 75
End: 2025-03-25

## 2025-03-25 VITALS
TEMPERATURE: 97.6 F | WEIGHT: 164.8 LBS | HEIGHT: 65 IN | HEART RATE: 78 BPM | DIASTOLIC BLOOD PRESSURE: 88 MMHG | OXYGEN SATURATION: 98 % | SYSTOLIC BLOOD PRESSURE: 146 MMHG | BODY MASS INDEX: 27.46 KG/M2

## 2025-03-25 DIAGNOSIS — Z13.6 ENCOUNTER FOR ABDOMINAL AORTIC ANEURYSM (AAA) SCREENING: ICD-10-CM

## 2025-03-25 DIAGNOSIS — Z13.1 SCREENING FOR DIABETES MELLITUS: ICD-10-CM

## 2025-03-25 DIAGNOSIS — I10 PRIMARY HYPERTENSION: Primary | ICD-10-CM

## 2025-03-25 PROCEDURE — 3017F COLORECTAL CA SCREEN DOC REV: CPT

## 2025-03-25 PROCEDURE — G8419 CALC BMI OUT NRM PARAM NOF/U: HCPCS

## 2025-03-25 PROCEDURE — 1123F ACP DISCUSS/DSCN MKR DOCD: CPT

## 2025-03-25 PROCEDURE — 3077F SYST BP >= 140 MM HG: CPT

## 2025-03-25 PROCEDURE — 99213 OFFICE O/P EST LOW 20 MIN: CPT

## 2025-03-25 PROCEDURE — 3079F DIAST BP 80-89 MM HG: CPT

## 2025-03-25 PROCEDURE — 1036F TOBACCO NON-USER: CPT

## 2025-03-25 PROCEDURE — 1159F MED LIST DOCD IN RCRD: CPT

## 2025-03-25 PROCEDURE — 99211 OFF/OP EST MAY X REQ PHY/QHP: CPT | Performed by: FAMILY MEDICINE

## 2025-03-25 PROCEDURE — 1126F AMNT PAIN NOTED NONE PRSNT: CPT

## 2025-03-25 PROCEDURE — G8427 DOCREV CUR MEDS BY ELIG CLIN: HCPCS

## 2025-03-25 SDOH — ECONOMIC STABILITY: FOOD INSECURITY: WITHIN THE PAST 12 MONTHS, YOU WORRIED THAT YOUR FOOD WOULD RUN OUT BEFORE YOU GOT MONEY TO BUY MORE.: NEVER TRUE

## 2025-03-25 SDOH — ECONOMIC STABILITY: FOOD INSECURITY: WITHIN THE PAST 12 MONTHS, THE FOOD YOU BOUGHT JUST DIDN'T LAST AND YOU DIDN'T HAVE MONEY TO GET MORE.: NEVER TRUE

## 2025-03-25 ASSESSMENT — ENCOUNTER SYMPTOMS
SHORTNESS OF BREATH: 0
ABDOMINAL PAIN: 0
CONSTIPATION: 0
NAUSEA: 0
DIARRHEA: 0
VOMITING: 0

## 2025-03-25 ASSESSMENT — PATIENT HEALTH QUESTIONNAIRE - PHQ9
SUM OF ALL RESPONSES TO PHQ QUESTIONS 1-9: 0
SUM OF ALL RESPONSES TO PHQ QUESTIONS 1-9: 0
1. LITTLE INTEREST OR PLEASURE IN DOING THINGS: NOT AT ALL
SUM OF ALL RESPONSES TO PHQ QUESTIONS 1-9: 0
2. FEELING DOWN, DEPRESSED OR HOPELESS: NOT AT ALL
SUM OF ALL RESPONSES TO PHQ QUESTIONS 1-9: 0

## 2025-03-25 NOTE — PROGRESS NOTES
[facility] Residency Program - Outpatient Note      Subjective:    Luis Jimenez is a 74 y.o. male with  has no past medical history on file.    Presented to the office today for:  Chief Complaint   Patient presents with    Hypertension    Health Maintenance     Due for cologuard on 5-       HPI  Patient is a 74-year-old male seen today for follow-up on hypertension    HTN  - BP today 156/84, repeat 146/88  - Meds: Amlodipine 10 mg  - checks BP at home. Forgot to bring reading in today. Runs around 130s-140s in the morning, then 120s-110s in the afternoon  - Denies headache, SOB, visual disturbances    Patient had lab draw done at [facility].  Only have the results for his lipid panel.  Discussed lipid panel results with patient and went over lifestyle management with them.  New orders placed for CMP and A1c printed out because patient requested to have it done at [facility] again.    Review of Systems   Constitutional:  Negative for fatigue.   Eyes:  Negative for visual disturbance.   Respiratory:  Negative for shortness of breath.    Cardiovascular:  Negative for chest pain and palpitations.   Gastrointestinal:  Negative for abdominal pain, constipation, diarrhea, nausea and vomiting.   Endocrine: Negative.    Genitourinary:  Negative for dysuria.   Musculoskeletal: Negative.    Skin:  Negative for rash.   Neurological:  Negative for headaches.   Psychiatric/Behavioral: Negative.                   The patient has a No family history on file.    Objective:    BP (!) 146/88 (BP Site: Left Upper Arm, Patient Position: Sitting, BP Cuff Size: Medium Adult)   Pulse 78   Temp 97.6 °F (36.4 °C) (Oral)   Ht 1.651 m (5' 5\")   Wt 74.8 kg (164 lb 12.8 oz)   SpO2 98%   BMI 27.42 kg/m²    BP Readings from Last 3 Encounters:   03/25/25 (!) 146/88   12/03/24 139/81   08/20/24 135/86       Physical Exam  Constitutional:       General: He is not in acute

## 2025-03-25 NOTE — PATIENT INSTRUCTIONS
Thank you for letting us take care of you today. We hope all your questions were addressed. If a question was overlooked or something else comes to mind after you return home, please contact a member of your Care Team listed below.      Your Care Team at [facility] is Team #2  Bam Cueva M.D. (Faculty)  Onur Pruitt M.D. (Resident)  Svetlana Arias M.D. (Resident)  Hortensia Arthur M.D. (Resident)  Anahi Samuel M.D. (Resident)  Bonita Dunn M.D. (Resident)  Conor Callahan, [facility]  Sivan Galloway, [facility]  Mary Dickens,  [facility]  Jocelyn Davis, [facility]  Jodi Murrieta, [facility]  Nikia Rayo, [facility]  Mark Hansen (LJ) Johnny JUAN R (Clinical Practice Manager)  Laine Chung [facility] (Clinical Pharmacist)     Office phone number: [phone]    If you need to get in right away due to illness, please be advised we have \"Same Day\" appointments available Monday-Friday. Please call us at [phone] option #3 to schedule your \"Same Day\" appointment.

## 2025-03-25 NOTE — PROGRESS NOTES
HYPERTENSION visit     BP Readings from Last 3 Encounters:   12/03/24 139/81   08/20/24 135/86   08/15/24 130/70       No results found for: \"HDL\", \"BUN\", \"CREATININE\", \"GLUCOSE\"           Have you changed or started any medications since your last visit including any over-the-counter medicines, vitamins, or herbal medicines? no   Have you stopped taking any of your medications? Is so, why? -  no  Are you having any side effects from any of your medications? - no  How often do you miss doses of your medication? occasional      Have you seen any other physician or provider since your last visit?  no   Have you had any other diagnostic tests since your last visit?  no   Have you been seen in the emergency room and/or had an admission in a hospital since we last saw you?  no   Have you had your routine dental cleaning in the past 6 months?  no     Do you have an active MyChart account? If no, what is the barrier?  Yes    Patient Care Team:  Hilario Cheng MD as PCP - General (Family Medicine)    Medical History Review  Past Medical, Family, and Social History reviewed and does not contribute to the patient presenting condition    Health Maintenance   Topic Date Due    Hepatitis C screen  Never done    Lipids  Never done    AAA screen  Never done    Pneumococcal 50+ years Vaccine (2 of 2 - PCV) 10/23/2021    Respiratory Syncytial Virus (RSV) Pregnant or age 60 yrs+ (1 - 1-dose 75+ series) Never done    Annual Wellness Visit (Medicare Advantage)  01/01/2025    COVID-19 Vaccine (7 - 2024-25 season) 03/16/2025    Colorectal Cancer Screen  05/17/2025    Depression Screen  12/03/2025    DTaP/Tdap/Td vaccine (2 - Td or Tdap) 07/27/2033    Flu vaccine  Completed    Shingles vaccine  Completed    Hepatitis A vaccine  Aged Out    Hepatitis B vaccine  Aged Out    Hib vaccine  Aged Out    Polio vaccine  Aged Out    Meningococcal (ACWY) vaccine  Aged Out    Meningococcal B vaccine  Aged Out

## 2025-03-25 NOTE — TELEPHONE ENCOUNTER
Writer spoke to Quin at [facility]. Per Quin didn't have the A1C nor CMP drawn, only shows the lipid lab. Writer provided lipid labs results to Dr. Arias.

## 2025-04-24 ENCOUNTER — HOSPITAL ENCOUNTER (OUTPATIENT)
Dept: VASCULAR LAB | Age: 75
Discharge: HOME | End: 2025-04-26
Payer: MEDICARE

## 2025-04-24 DIAGNOSIS — Z13.6 ENCOUNTER FOR ABDOMINAL AORTIC ANEURYSM (AAA) SCREENING: ICD-10-CM

## 2025-04-24 PROBLEM — Z13.1 SCREENING FOR DIABETES MELLITUS: Status: RESOLVED | Noted: 2025-03-25 | Resolved: 2025-04-24

## 2025-04-24 LAB
VAS AORTA DIST AP: 1.43 CM
VAS AORTA DIST PSV: 87.5 CM/S
VAS AORTA DIST TR: 1.39 CM
VAS AORTA MID AP: 1.53 CM
VAS AORTA MID PSV: 96.6 CM/S
VAS AORTA MID TRANS: 1.57 CM
VAS AORTA PROX AP: 2.35 CM
VAS AORTA PROX PSV: 102.1 CM/S
VAS AORTA PROX TR: 2.35 CM
VAS LEFT COM ILIAC AP: 0.99 CM
VAS LEFT COM ILIAC PROX PSV: 105.1 CM/S
VAS LEFT COM ILIAC TRANS: 0.97 CM
VAS RIGHT COM ILIAC AP: 0.93 CM
VAS RIGHT COM ILIAC PROX PSV: 138 CM/S
VAS RIGHT COM ILIAC TRANS: 0.95 CM

## 2025-04-24 PROCEDURE — 76706 US ABDL AORTA SCREEN AAA: CPT

## 2025-04-24 PROCEDURE — 76706 US ABDL AORTA SCREEN AAA: CPT | Performed by: SURGERY

## 2025-04-25 ENCOUNTER — RESULTS FOLLOW-UP (OUTPATIENT)
Dept: FAMILY MEDICINE CLINIC | Age: 75
End: 2025-04-25